# Patient Record
Sex: MALE | Race: BLACK OR AFRICAN AMERICAN | NOT HISPANIC OR LATINO | URBAN - METROPOLITAN AREA
[De-identification: names, ages, dates, MRNs, and addresses within clinical notes are randomized per-mention and may not be internally consistent; named-entity substitution may affect disease eponyms.]

---

## 2017-02-01 ENCOUNTER — INPATIENT (INPATIENT)
Facility: HOSPITAL | Age: 52
LOS: 2 days | Discharge: HOME | End: 2017-02-04
Attending: HOSPITALIST | Admitting: HOSPITALIST
Payer: COMMERCIAL

## 2017-02-01 VITALS
RESPIRATION RATE: 18 BRPM | DIASTOLIC BLOOD PRESSURE: 101 MMHG | TEMPERATURE: 98 F | SYSTOLIC BLOOD PRESSURE: 147 MMHG | OXYGEN SATURATION: 97 % | HEART RATE: 144 BPM

## 2017-02-01 LAB
ALBUMIN SERPL ELPH-MCNC: 5 G/DL — SIGNIFICANT CHANGE UP (ref 3.3–5)
ALP SERPL-CCNC: 69 U/L — SIGNIFICANT CHANGE UP (ref 40–120)
ALT FLD-CCNC: 22 U/L — SIGNIFICANT CHANGE UP (ref 4–41)
APPEARANCE UR: CLEAR — SIGNIFICANT CHANGE UP
APTT BLD: 30.3 SEC — SIGNIFICANT CHANGE UP (ref 27.5–37.4)
AST SERPL-CCNC: 13 U/L — SIGNIFICANT CHANGE UP (ref 4–40)
B-OH-BUTYR SERPL-SCNC: 0.5 MMOL/L — HIGH (ref 0–0.4)
BASE EXCESS BLDV CALC-SCNC: 4 MMOL/L — SIGNIFICANT CHANGE UP
BASE EXCESS BLDV CALC-SCNC: 6.1 MMOL/L — SIGNIFICANT CHANGE UP
BASOPHILS # BLD AUTO: 0.01 K/UL — SIGNIFICANT CHANGE UP (ref 0–0.2)
BASOPHILS NFR BLD AUTO: 0.1 % — SIGNIFICANT CHANGE UP (ref 0–2)
BILIRUB SERPL-MCNC: 0.7 MG/DL — SIGNIFICANT CHANGE UP (ref 0.2–1.2)
BILIRUB UR-MCNC: NEGATIVE — SIGNIFICANT CHANGE UP
BLOOD GAS VENOUS - CREATININE: 1.19 MG/DL — SIGNIFICANT CHANGE UP (ref 0.5–1.3)
BLOOD GAS VENOUS - CREATININE: 1.58 MG/DL — HIGH (ref 0.5–1.3)
BLOOD UR QL VISUAL: NEGATIVE — SIGNIFICANT CHANGE UP
BUN SERPL-MCNC: 27 MG/DL — HIGH (ref 7–23)
CALCIUM SERPL-MCNC: 13.2 MG/DL — CRITICAL HIGH (ref 8.4–10.5)
CHLORIDE BLDV-SCNC: 114 MMOL/L — HIGH (ref 96–108)
CHLORIDE BLDV-SCNC: 97 MMOL/L — SIGNIFICANT CHANGE UP (ref 96–108)
CHLORIDE SERPL-SCNC: 92 MMOL/L — LOW (ref 98–107)
CK MB BLD-MCNC: 1.21 NG/ML — SIGNIFICANT CHANGE UP (ref 1–6.6)
CK MB BLD-MCNC: SIGNIFICANT CHANGE UP (ref 0–2.5)
CK SERPL-CCNC: 132 U/L — SIGNIFICANT CHANGE UP (ref 30–200)
CO2 SERPL-SCNC: 26 MMOL/L — SIGNIFICANT CHANGE UP (ref 22–31)
COLOR SPEC: SIGNIFICANT CHANGE UP
CREAT SERPL-MCNC: 1.91 MG/DL — HIGH (ref 0.5–1.3)
EOSINOPHIL # BLD AUTO: 0 K/UL — SIGNIFICANT CHANGE UP (ref 0–0.5)
EOSINOPHIL NFR BLD AUTO: 0 % — SIGNIFICANT CHANGE UP (ref 0–6)
GAS PNL BLDV: 140 MMOL/L — SIGNIFICANT CHANGE UP (ref 136–146)
GAS PNL BLDV: 148 MMOL/L — HIGH (ref 136–146)
GLUCOSE BLDV-MCNC: 1034 — CRITICAL HIGH (ref 70–99)
GLUCOSE BLDV-MCNC: 594 — CRITICAL HIGH (ref 70–99)
GLUCOSE SERPL-MCNC: 1053 MG/DL — CRITICAL HIGH (ref 70–99)
GLUCOSE UR-MCNC: >1000 — SIGNIFICANT CHANGE UP
HBA1C BLD-MCNC: 12.4 % — HIGH (ref 4–5.6)
HCO3 BLDV-SCNC: 27 MMOL/L — SIGNIFICANT CHANGE UP (ref 20–27)
HCO3 BLDV-SCNC: 27 MMOL/L — SIGNIFICANT CHANGE UP (ref 20–27)
HCT VFR BLD CALC: 46.1 % — SIGNIFICANT CHANGE UP (ref 39–50)
HCT VFR BLDV CALC: 45.2 % — SIGNIFICANT CHANGE UP (ref 39–51)
HCT VFR BLDV CALC: 51.1 % — HIGH (ref 39–51)
HGB BLD-MCNC: 15.4 G/DL — SIGNIFICANT CHANGE UP (ref 13–17)
HGB BLDV-MCNC: 14.7 G/DL — SIGNIFICANT CHANGE UP (ref 13–17)
HGB BLDV-MCNC: 16.7 G/DL — SIGNIFICANT CHANGE UP (ref 13–17)
IMM GRANULOCYTES NFR BLD AUTO: 0.1 % — SIGNIFICANT CHANGE UP (ref 0–1.5)
INR BLD: 1.08 — SIGNIFICANT CHANGE UP (ref 0.87–1.18)
KETONES UR-MCNC: SIGNIFICANT CHANGE UP
LACTATE BLDV-MCNC: 2.8 MMOL/L — HIGH (ref 0.5–2)
LACTATE BLDV-MCNC: 4.5 MMOL/L — CRITICAL HIGH (ref 0.5–2)
LEUKOCYTE ESTERASE UR-ACNC: NEGATIVE — SIGNIFICANT CHANGE UP
LYMPHOCYTES # BLD AUTO: 1.39 K/UL — SIGNIFICANT CHANGE UP (ref 1–3.3)
LYMPHOCYTES # BLD AUTO: 12.5 % — LOW (ref 13–44)
MCHC RBC-ENTMCNC: 30.1 PG — SIGNIFICANT CHANGE UP (ref 27–34)
MCHC RBC-ENTMCNC: 33.4 % — SIGNIFICANT CHANGE UP (ref 32–36)
MCV RBC AUTO: 90.2 FL — SIGNIFICANT CHANGE UP (ref 80–100)
MONOCYTES # BLD AUTO: 0.89 K/UL — SIGNIFICANT CHANGE UP (ref 0–0.9)
MONOCYTES NFR BLD AUTO: 8 % — SIGNIFICANT CHANGE UP (ref 2–14)
NEUTROPHILS # BLD AUTO: 8.79 K/UL — HIGH (ref 1.8–7.4)
NEUTROPHILS NFR BLD AUTO: 79.3 % — HIGH (ref 43–77)
NITRITE UR-MCNC: NEGATIVE — SIGNIFICANT CHANGE UP
PCO2 BLDV: 52 MMHG — HIGH (ref 41–51)
PCO2 BLDV: 56 MMHG — HIGH (ref 41–51)
PH BLDV: 7.37 PH — SIGNIFICANT CHANGE UP (ref 7.32–7.43)
PH BLDV: 7.37 PH — SIGNIFICANT CHANGE UP (ref 7.32–7.43)
PH UR: 6 — SIGNIFICANT CHANGE UP (ref 4.6–8)
PLATELET # BLD AUTO: 212 K/UL — SIGNIFICANT CHANGE UP (ref 150–400)
PMV BLD: 12.4 FL — SIGNIFICANT CHANGE UP (ref 7–13)
PO2 BLDV: 37 MMHG — SIGNIFICANT CHANGE UP (ref 35–40)
PO2 BLDV: 56 MMHG — HIGH (ref 35–40)
POTASSIUM BLDV-SCNC: 3.8 MMOL/L — SIGNIFICANT CHANGE UP (ref 3.4–4.5)
POTASSIUM BLDV-SCNC: 3.8 MMOL/L — SIGNIFICANT CHANGE UP (ref 3.4–4.5)
POTASSIUM SERPL-MCNC: 3.8 MMOL/L — SIGNIFICANT CHANGE UP (ref 3.5–5.3)
POTASSIUM SERPL-SCNC: 3.8 MMOL/L — SIGNIFICANT CHANGE UP (ref 3.5–5.3)
PROT SERPL-MCNC: 8.8 G/DL — HIGH (ref 6–8.3)
PROT UR-MCNC: NEGATIVE — SIGNIFICANT CHANGE UP
PROTHROM AB SERPL-ACNC: 12.3 SEC — SIGNIFICANT CHANGE UP (ref 10–13.1)
RBC # BLD: 5.11 M/UL — SIGNIFICANT CHANGE UP (ref 4.2–5.8)
RBC # FLD: 12.8 % — SIGNIFICANT CHANGE UP (ref 10.3–14.5)
RBC CASTS # UR COMP ASSIST: SIGNIFICANT CHANGE UP (ref 0–?)
SAO2 % BLDV: 61.9 % — SIGNIFICANT CHANGE UP (ref 60–85)
SAO2 % BLDV: 86.4 % — HIGH (ref 60–85)
SODIUM SERPL-SCNC: 142 MMOL/L — SIGNIFICANT CHANGE UP (ref 135–145)
SP GR SPEC: 1.03 — HIGH (ref 1–1.03)
T3 SERPL-MCNC: 91.9 NG/DL — SIGNIFICANT CHANGE UP (ref 80–200)
T4 FREE SERPL-MCNC: 1.63 NG/DL — SIGNIFICANT CHANGE UP (ref 0.9–1.8)
TROPONIN T SERPL-MCNC: < 0.06 NG/ML — SIGNIFICANT CHANGE UP (ref 0–0.06)
TSH SERPL-MCNC: 0.3 UIU/ML — SIGNIFICANT CHANGE UP (ref 0.27–4.2)
UROBILINOGEN FLD QL: NORMAL E.U. — SIGNIFICANT CHANGE UP (ref 0.1–0.2)
WBC # BLD: 11.09 K/UL — HIGH (ref 3.8–10.5)
WBC # FLD AUTO: 11.09 K/UL — HIGH (ref 3.8–10.5)
WBC UR QL: SIGNIFICANT CHANGE UP (ref 0–?)

## 2017-02-01 RX ORDER — SODIUM CHLORIDE 9 MG/ML
2000 INJECTION INTRAMUSCULAR; INTRAVENOUS; SUBCUTANEOUS ONCE
Qty: 0 | Refills: 0 | Status: COMPLETED | OUTPATIENT
Start: 2017-02-01 | End: 2017-02-01

## 2017-02-01 RX ORDER — POTASSIUM CHLORIDE 20 MEQ
40 PACKET (EA) ORAL ONCE
Qty: 0 | Refills: 0 | Status: COMPLETED | OUTPATIENT
Start: 2017-02-01 | End: 2017-02-01

## 2017-02-01 RX ORDER — SODIUM CHLORIDE 9 MG/ML
1000 INJECTION, SOLUTION INTRAVENOUS
Qty: 0 | Refills: 0 | Status: COMPLETED | OUTPATIENT
Start: 2017-02-01 | End: 2017-02-02

## 2017-02-01 RX ORDER — POTASSIUM CHLORIDE 20 MEQ
10 PACKET (EA) ORAL ONCE
Qty: 0 | Refills: 0 | Status: COMPLETED | OUTPATIENT
Start: 2017-02-01 | End: 2017-02-01

## 2017-02-01 RX ORDER — INSULIN HUMAN 100 [IU]/ML
4 INJECTION, SOLUTION SUBCUTANEOUS
Qty: 100 | Refills: 0 | Status: DISCONTINUED | OUTPATIENT
Start: 2017-02-01 | End: 2017-02-02

## 2017-02-01 RX ADMIN — Medication 40 MILLIEQUIVALENT(S): at 19:29

## 2017-02-01 RX ADMIN — SODIUM CHLORIDE 1000 MILLILITER(S): 9 INJECTION, SOLUTION INTRAVENOUS at 23:52

## 2017-02-01 RX ADMIN — INSULIN HUMAN 4 UNIT(S)/HR: 100 INJECTION, SOLUTION SUBCUTANEOUS at 20:30

## 2017-02-01 RX ADMIN — SODIUM CHLORIDE 1000 MILLILITER(S): 9 INJECTION INTRAMUSCULAR; INTRAVENOUS; SUBCUTANEOUS at 20:05

## 2017-02-01 RX ADMIN — Medication 100 MILLIEQUIVALENT(S): at 22:16

## 2017-02-01 RX ADMIN — SODIUM CHLORIDE 1000 MILLILITER(S): 9 INJECTION INTRAMUSCULAR; INTRAVENOUS; SUBCUTANEOUS at 18:15

## 2017-02-01 RX ADMIN — INSULIN HUMAN 4 UNIT(S)/HR: 100 INJECTION, SOLUTION SUBCUTANEOUS at 19:22

## 2017-02-01 NOTE — ED ADULT TRIAGE NOTE - CHIEF COMPLAINT QUOTE
pt c/o worsening dizziness over the past two days, and reports he is unable to "keep anything down, I keep peeing it all out". denies any other symptoms. PMHX; borderline dm, htn, thyroidectomy, high cholesterol

## 2017-02-01 NOTE — ED PROVIDER NOTE - MEDICAL DECISION MAKING DETAILS
51M h/o HTN, HLD, DM presenting with nausea, lightheadedness, polyuria, polydipsia, concerning for symptomatic hyperglycemia vs HONK  -labs, ekg, fluids

## 2017-02-01 NOTE — ED ADULT NURSE NOTE - CHPI ED SYMPTOMS NEG
no nausea/no back pain/no shortness of breath/no diaphoresis/no fever/no chills/no syncope/no vomiting/no cough

## 2017-02-01 NOTE — ED PROVIDER NOTE - ATTENDING CONTRIBUTION TO CARE
pt c/o 2 days of dizziness associated polyuria/polydipsia wt loss    exam tachycardic  dry mouth clear lungs  benign abf  no LE edema  no tremor  nl thyroid  bedside FS   glucose high   stat glucose  > 1000    K 3.8  IV fluids begun  will also start insulin  ICU and endocrine consulted

## 2017-02-01 NOTE — ED PROVIDER NOTE - OBJECTIVE STATEMENT
51M h/o DM2, HTN, thyroidectomy, HLD presenting with lightheadedness. Notes polyuria, polydipsia, nausea, lightheadedness, blurry vision for past 2 days associated with weight loss. Takes metformin for diabetes, not on insulin. Denies F, cough, CP/SOB, abd pain, vomiting, dysuria.

## 2017-02-01 NOTE — ED PROVIDER NOTE - PROGRESS NOTE DETAILS
D/w endo who recommended continuing insulin gtt until FSG falls to 300, sending T3 & free T4, and MICU consult. D/w MICU who will be seeing pt

## 2017-02-02 DIAGNOSIS — N17.9 ACUTE KIDNEY FAILURE, UNSPECIFIED: ICD-10-CM

## 2017-02-02 DIAGNOSIS — E87.0 HYPEROSMOLALITY AND HYPERNATREMIA: ICD-10-CM

## 2017-02-02 DIAGNOSIS — E11.01 TYPE 2 DIABETES MELLITUS WITH HYPEROSMOLARITY WITH COMA: ICD-10-CM

## 2017-02-02 DIAGNOSIS — Z92.3 PERSONAL HISTORY OF IRRADIATION: Chronic | ICD-10-CM

## 2017-02-02 DIAGNOSIS — R73.9 HYPERGLYCEMIA, UNSPECIFIED: ICD-10-CM

## 2017-02-02 DIAGNOSIS — E07.9 DISORDER OF THYROID, UNSPECIFIED: ICD-10-CM

## 2017-02-02 DIAGNOSIS — E89.0 POSTPROCEDURAL HYPOTHYROIDISM: Chronic | ICD-10-CM

## 2017-02-02 LAB
BASE EXCESS BLDV CALC-SCNC: 3.6 MMOL/L — SIGNIFICANT CHANGE UP
BASOPHILS # BLD AUTO: 0.02 K/UL — SIGNIFICANT CHANGE UP (ref 0–0.2)
BASOPHILS NFR BLD AUTO: 0.2 % — SIGNIFICANT CHANGE UP (ref 0–2)
BLOOD GAS VENOUS - CREATININE: 1.17 MG/DL — SIGNIFICANT CHANGE UP (ref 0.5–1.3)
BUN SERPL-MCNC: 18 MG/DL — SIGNIFICANT CHANGE UP (ref 7–23)
BUN SERPL-MCNC: 20 MG/DL — SIGNIFICANT CHANGE UP (ref 7–23)
CALCIUM SERPL-MCNC: 10.1 MG/DL — SIGNIFICANT CHANGE UP (ref 8.4–10.5)
CALCIUM SERPL-MCNC: 10.5 MG/DL — SIGNIFICANT CHANGE UP (ref 8.4–10.5)
CHLORIDE BLDV-SCNC: 116 MMOL/L — HIGH (ref 96–108)
CHLORIDE SERPL-SCNC: 110 MMOL/L — HIGH (ref 98–107)
CHLORIDE SERPL-SCNC: 113 MMOL/L — HIGH (ref 98–107)
CHOLEST SERPL-MCNC: 131 MG/DL — SIGNIFICANT CHANGE UP (ref 120–199)
CO2 SERPL-SCNC: 27 MMOL/L — SIGNIFICANT CHANGE UP (ref 22–31)
CO2 SERPL-SCNC: 28 MMOL/L — SIGNIFICANT CHANGE UP (ref 22–31)
CREAT SERPL-MCNC: 1.23 MG/DL — SIGNIFICANT CHANGE UP (ref 0.5–1.3)
CREAT SERPL-MCNC: 1.39 MG/DL — HIGH (ref 0.5–1.3)
EOSINOPHIL # BLD AUTO: 0.04 K/UL — SIGNIFICANT CHANGE UP (ref 0–0.5)
EOSINOPHIL NFR BLD AUTO: 0.3 % — SIGNIFICANT CHANGE UP (ref 0–6)
GAS PNL BLDV: 150 MMOL/L — HIGH (ref 136–146)
GLUCOSE BLDV-MCNC: 404 — HIGH (ref 70–99)
GLUCOSE SERPL-MCNC: 374 MG/DL — HIGH (ref 70–99)
GLUCOSE SERPL-MCNC: 414 MG/DL — HIGH (ref 70–99)
HCO3 BLDV-SCNC: 26 MMOL/L — SIGNIFICANT CHANGE UP (ref 20–27)
HCT VFR BLD CALC: 43.9 % — SIGNIFICANT CHANGE UP (ref 39–50)
HCT VFR BLDV CALC: 44.2 % — SIGNIFICANT CHANGE UP (ref 39–51)
HDLC SERPL-MCNC: 46 MG/DL — SIGNIFICANT CHANGE UP (ref 35–55)
HGB BLD-MCNC: 14.2 G/DL — SIGNIFICANT CHANGE UP (ref 13–17)
HGB BLDV-MCNC: 14.4 G/DL — SIGNIFICANT CHANGE UP (ref 13–17)
IMM GRANULOCYTES NFR BLD AUTO: 0.3 % — SIGNIFICANT CHANGE UP (ref 0–1.5)
LACTATE BLDV-MCNC: 2.4 MMOL/L — HIGH (ref 0.5–2)
LACTATE SERPL-SCNC: 3.1 MMOL/L — HIGH (ref 0.5–2)
LIPID PNL WITH DIRECT LDL SERPL: 79 MG/DL — SIGNIFICANT CHANGE UP
LYMPHOCYTES # BLD AUTO: 16.7 % — SIGNIFICANT CHANGE UP (ref 13–44)
LYMPHOCYTES # BLD AUTO: 2.11 K/UL — SIGNIFICANT CHANGE UP (ref 1–3.3)
MAGNESIUM SERPL-MCNC: 2 MG/DL — SIGNIFICANT CHANGE UP (ref 1.6–2.6)
MAGNESIUM SERPL-MCNC: 2 MG/DL — SIGNIFICANT CHANGE UP (ref 1.6–2.6)
MCHC RBC-ENTMCNC: 29.6 PG — SIGNIFICANT CHANGE UP (ref 27–34)
MCHC RBC-ENTMCNC: 32.3 % — SIGNIFICANT CHANGE UP (ref 32–36)
MCV RBC AUTO: 91.5 FL — SIGNIFICANT CHANGE UP (ref 80–100)
MONOCYTES # BLD AUTO: 1.26 K/UL — HIGH (ref 0–0.9)
MONOCYTES NFR BLD AUTO: 10 % — SIGNIFICANT CHANGE UP (ref 2–14)
NEUTROPHILS # BLD AUTO: 9.15 K/UL — HIGH (ref 1.8–7.4)
NEUTROPHILS NFR BLD AUTO: 72.5 % — SIGNIFICANT CHANGE UP (ref 43–77)
PCO2 BLDV: 56 MMHG — HIGH (ref 41–51)
PH BLDV: 7.34 PH — SIGNIFICANT CHANGE UP (ref 7.32–7.43)
PHOSPHATE SERPL-MCNC: 3.2 MG/DL — SIGNIFICANT CHANGE UP (ref 2.5–4.5)
PHOSPHATE SERPL-MCNC: 3.3 MG/DL — SIGNIFICANT CHANGE UP (ref 2.5–4.5)
PLATELET # BLD AUTO: 169 K/UL — SIGNIFICANT CHANGE UP (ref 150–400)
PMV BLD: 11.8 FL — SIGNIFICANT CHANGE UP (ref 7–13)
PO2 BLDV: 48 MMHG — HIGH (ref 35–40)
POTASSIUM BLDV-SCNC: 4.1 MMOL/L — SIGNIFICANT CHANGE UP (ref 3.4–4.5)
POTASSIUM SERPL-MCNC: 4.9 MMOL/L — SIGNIFICANT CHANGE UP (ref 3.5–5.3)
POTASSIUM SERPL-MCNC: 4.9 MMOL/L — SIGNIFICANT CHANGE UP (ref 3.5–5.3)
POTASSIUM SERPL-SCNC: 4.9 MMOL/L — SIGNIFICANT CHANGE UP (ref 3.5–5.3)
POTASSIUM SERPL-SCNC: 4.9 MMOL/L — SIGNIFICANT CHANGE UP (ref 3.5–5.3)
RBC # BLD: 4.8 M/UL — SIGNIFICANT CHANGE UP (ref 4.2–5.8)
RBC # FLD: 12.8 % — SIGNIFICANT CHANGE UP (ref 10.3–14.5)
SAO2 % BLDV: 79.6 % — SIGNIFICANT CHANGE UP (ref 60–85)
SODIUM SERPL-SCNC: 151 MMOL/L — HIGH (ref 135–145)
SODIUM SERPL-SCNC: 156 MMOL/L — HIGH (ref 135–145)
TRIGL SERPL-MCNC: 93 MG/DL — SIGNIFICANT CHANGE UP (ref 10–149)
WBC # BLD: 12.62 K/UL — HIGH (ref 3.8–10.5)
WBC # FLD AUTO: 12.62 K/UL — HIGH (ref 3.8–10.5)

## 2017-02-02 PROCEDURE — 99223 1ST HOSP IP/OBS HIGH 75: CPT | Mod: GC

## 2017-02-02 RX ORDER — GLUCAGON INJECTION, SOLUTION 0.5 MG/.1ML
1 INJECTION, SOLUTION SUBCUTANEOUS ONCE
Qty: 0 | Refills: 0 | Status: DISCONTINUED | OUTPATIENT
Start: 2017-02-02 | End: 2017-02-04

## 2017-02-02 RX ORDER — DEXTROSE 50 % IN WATER 50 %
25 SYRINGE (ML) INTRAVENOUS ONCE
Qty: 0 | Refills: 0 | Status: DISCONTINUED | OUTPATIENT
Start: 2017-02-02 | End: 2017-02-04

## 2017-02-02 RX ORDER — SIMVASTATIN 20 MG/1
1 TABLET, FILM COATED ORAL
Qty: 0 | Refills: 0 | COMMUNITY

## 2017-02-02 RX ORDER — INSULIN LISPRO 100/ML
VIAL (ML) SUBCUTANEOUS EVERY 4 HOURS
Qty: 0 | Refills: 0 | Status: DISCONTINUED | OUTPATIENT
Start: 2017-02-02 | End: 2017-02-02

## 2017-02-02 RX ORDER — INSULIN GLARGINE 100 [IU]/ML
20 INJECTION, SOLUTION SUBCUTANEOUS AT BEDTIME
Qty: 0 | Refills: 0 | Status: DISCONTINUED | OUTPATIENT
Start: 2017-02-02 | End: 2017-02-04

## 2017-02-02 RX ORDER — LEVOTHYROXINE SODIUM 125 MCG
112 TABLET ORAL DAILY
Qty: 0 | Refills: 0 | Status: DISCONTINUED | OUTPATIENT
Start: 2017-02-02 | End: 2017-02-04

## 2017-02-02 RX ORDER — INSULIN LISPRO 100/ML
5 VIAL (ML) SUBCUTANEOUS
Qty: 0 | Refills: 0 | Status: DISCONTINUED | OUTPATIENT
Start: 2017-02-02 | End: 2017-02-02

## 2017-02-02 RX ORDER — SODIUM CHLORIDE 9 MG/ML
1000 INJECTION, SOLUTION INTRAVENOUS
Qty: 0 | Refills: 0 | Status: DISCONTINUED | OUTPATIENT
Start: 2017-02-02 | End: 2017-02-04

## 2017-02-02 RX ORDER — SIMVASTATIN 20 MG/1
20 TABLET, FILM COATED ORAL AT BEDTIME
Qty: 0 | Refills: 0 | Status: DISCONTINUED | OUTPATIENT
Start: 2017-02-02 | End: 2017-02-04

## 2017-02-02 RX ORDER — AMLODIPINE BESYLATE 2.5 MG/1
10 TABLET ORAL DAILY
Qty: 0 | Refills: 0 | Status: DISCONTINUED | OUTPATIENT
Start: 2017-02-02 | End: 2017-02-04

## 2017-02-02 RX ORDER — SIMVASTATIN 20 MG/1
40 TABLET, FILM COATED ORAL AT BEDTIME
Qty: 0 | Refills: 0 | Status: DISCONTINUED | OUTPATIENT
Start: 2017-02-02 | End: 2017-02-02

## 2017-02-02 RX ORDER — INSULIN LISPRO 100/ML
6 VIAL (ML) SUBCUTANEOUS
Qty: 0 | Refills: 0 | Status: DISCONTINUED | OUTPATIENT
Start: 2017-02-02 | End: 2017-02-03

## 2017-02-02 RX ORDER — LEVOTHYROXINE SODIUM 125 MCG
1 TABLET ORAL
Qty: 0 | Refills: 0 | COMMUNITY

## 2017-02-02 RX ORDER — INSULIN LISPRO 100/ML
VIAL (ML) SUBCUTANEOUS
Qty: 0 | Refills: 0 | Status: DISCONTINUED | OUTPATIENT
Start: 2017-02-02 | End: 2017-02-04

## 2017-02-02 RX ORDER — DEXTROSE 50 % IN WATER 50 %
12.5 SYRINGE (ML) INTRAVENOUS ONCE
Qty: 0 | Refills: 0 | Status: DISCONTINUED | OUTPATIENT
Start: 2017-02-02 | End: 2017-02-04

## 2017-02-02 RX ORDER — INSULIN LISPRO 100/ML
VIAL (ML) SUBCUTANEOUS AT BEDTIME
Qty: 0 | Refills: 0 | Status: DISCONTINUED | OUTPATIENT
Start: 2017-02-02 | End: 2017-02-04

## 2017-02-02 RX ORDER — HEPARIN SODIUM 5000 [USP'U]/ML
5000 INJECTION INTRAVENOUS; SUBCUTANEOUS EVERY 8 HOURS
Qty: 0 | Refills: 0 | Status: DISCONTINUED | OUTPATIENT
Start: 2017-02-02 | End: 2017-02-04

## 2017-02-02 RX ORDER — METFORMIN HYDROCHLORIDE 850 MG/1
1 TABLET ORAL
Qty: 0 | Refills: 0 | COMMUNITY

## 2017-02-02 RX ORDER — DEXTROSE 50 % IN WATER 50 %
1 SYRINGE (ML) INTRAVENOUS ONCE
Qty: 0 | Refills: 0 | Status: DISCONTINUED | OUTPATIENT
Start: 2017-02-02 | End: 2017-02-04

## 2017-02-02 RX ORDER — INSULIN GLARGINE 100 [IU]/ML
15 INJECTION, SOLUTION SUBCUTANEOUS ONCE
Qty: 0 | Refills: 0 | Status: COMPLETED | OUTPATIENT
Start: 2017-02-02 | End: 2017-02-02

## 2017-02-02 RX ORDER — INSULIN LISPRO 100/ML
5 VIAL (ML) SUBCUTANEOUS ONCE
Qty: 0 | Refills: 0 | Status: DISCONTINUED | OUTPATIENT
Start: 2017-02-02 | End: 2017-02-02

## 2017-02-02 RX ADMIN — HEPARIN SODIUM 5000 UNIT(S): 5000 INJECTION INTRAVENOUS; SUBCUTANEOUS at 07:42

## 2017-02-02 RX ADMIN — SODIUM CHLORIDE 1000 MILLILITER(S): 9 INJECTION, SOLUTION INTRAVENOUS at 00:46

## 2017-02-02 RX ADMIN — SIMVASTATIN 20 MILLIGRAM(S): 20 TABLET, FILM COATED ORAL at 21:48

## 2017-02-02 RX ADMIN — AMLODIPINE BESYLATE 10 MILLIGRAM(S): 2.5 TABLET ORAL at 07:43

## 2017-02-02 RX ADMIN — INSULIN GLARGINE 15 UNIT(S): 100 INJECTION, SOLUTION SUBCUTANEOUS at 01:09

## 2017-02-02 RX ADMIN — INSULIN GLARGINE 20 UNIT(S): 100 INJECTION, SOLUTION SUBCUTANEOUS at 21:48

## 2017-02-02 RX ADMIN — Medication 6: at 18:01

## 2017-02-02 RX ADMIN — Medication 4: at 14:07

## 2017-02-02 RX ADMIN — HEPARIN SODIUM 5000 UNIT(S): 5000 INJECTION INTRAVENOUS; SUBCUTANEOUS at 14:07

## 2017-02-02 RX ADMIN — HEPARIN SODIUM 5000 UNIT(S): 5000 INJECTION INTRAVENOUS; SUBCUTANEOUS at 21:48

## 2017-02-02 RX ADMIN — Medication 112 MICROGRAM(S): at 07:42

## 2017-02-02 RX ADMIN — SODIUM CHLORIDE 100 MILLILITER(S): 9 INJECTION, SOLUTION INTRAVENOUS at 07:50

## 2017-02-02 RX ADMIN — Medication 5: at 10:42

## 2017-02-02 RX ADMIN — Medication 6 UNIT(S): at 18:00

## 2017-02-02 NOTE — H&P ADULT. - PROBLEM SELECTOR PLAN 1
Meets criteria for HHS given hyperglycemia >600 w/ trace serum & urinary ketones  -S/p insulin gtt  -Endo eval in ED- verbal recs were to c/w insulin gtt until   -Trend BMP Meets criteria for HHS given hyperglycemia >600 w/ trace serum & urinary ketones  -S/p insulin gtt  -Endo eval in ED- verbal recs were to c/w insulin gtt until   -Trend BMP, FS q4hr  -C/w IVF Meets criteria for HHS given hyperglycemia >600 w/ trace serum & urinary ketones  -S/p insulin gtt & 5L IVF  -Endo eval in ED- verbal recs were to c/w insulin gtt until   -Trend BMP, FS q4hr  -C/w IVF

## 2017-02-02 NOTE — H&P ADULT. - PROBLEM SELECTOR PLAN 2
Hyperglycemia typically cause hyponatremia, elevation after IVF possibly due to electrolyte shifts  -Trend BMP Hyperglycemia typically cause hyponatremia, elevation after IVF possibly due to electrolyte shifts  -Trend BMP  -1/2NS at 100cc/hr

## 2017-02-02 NOTE — H&P ADULT. - PMH
HLD (hyperlipidemia)    HTN (hypertension)    T2DM (type 2 diabetes mellitus) HLD (hyperlipidemia)    HTN (hypertension)    Hyperthyroidism    T2DM (type 2 diabetes mellitus)

## 2017-02-02 NOTE — H&P ADULT. - HISTORY OF PRESENT ILLNESS
51M hx HTN, HLD, T2DM, s/p thyroidectomy, p/w lightheadedness in setting of polyuria, polydipsia, nausea, blurry vision x 2 days.  Pt is on metformin for his T2DM. Pt denies any fevers, chills, HA, CP, SOB, abd pain, V, changes in BMs.    ED course:  T 97.7, HR 93, /89, RR 20, O2 100% RA. Labs remarkable for glucose 1053, Na 142, K 3.8, Ca 13.2, BUN/Cr 25/1.91. Lactate 4.5. Trace serum & urinary ketones. Received insulin gtt at 4 units/hr, lantus 15U, IV & PO K+ supplementation, NS 4L & 1/2 NS 1L. On repeat labs, glucose 414, Na 156, Ca 10.5, BUN/Cr 20.139, lactate 2.4.  HgbA1c 12.4. 51M hx HTN, HLD, T2DM, hyperthyroidism s/p ablation, p/w lightheadedness in setting of polyuria, polydipsia, nausea, blurry vision x 2-3 days. Pt also endorsing some decreased appetite but he denies any abd pain or vomiting. He reports some unintentional weight loss as he recently was at his PMD w/ a weight of 211 lbs which is a decrease from his baseline of 225 lbs. Pt is on metformin for his T2DM. Pt denies any fevers, chills, HA, CP, SOB, abd pain, V, changes in bowel movements.    ED course:  T 97.7, HR 93, /89, RR 20, O2 100% RA. Labs remarkable for glucose 1053, Na 142, K 3.8, Ca 13.2, BUN/Cr 25/1.91. Lactate 4.5. Trace serum & urinary ketones. Received insulin gtt at 4 units/hr, lantus 15U, IV & PO K+ supplementation, NS 4L & 1/2 NS 1L. On repeat labs, glucose 414, Na 156, Ca 10.5, BUN/Cr 20.139, lactate 2.4.  HgbA1c 12.4.

## 2017-02-02 NOTE — H&P ADULT. - ASSESSMENT
51M hx HTN, HLD, T2DM, s/p thyroidectomy, p/w lightheadedness in setting of polyuria, polydipsia, nausea, blurry vision x 2 days, found to have glucose >1000 & trace urinary & serum ketones, also w/ HgbA1c 12.4 51M hx HTN, HLD, T2DM, hyperthyroidism s/p ablation, p/w lightheadedness in setting of polyuria, polydipsia, nausea, blurry vision x 2 days, found to have glucose >1000 & trace urinary & serum ketones, also w/ HgbA1c 12.4

## 2017-02-02 NOTE — H&P ADULT. - PROBLEM SELECTOR PLAN 3
Pre-renal given improvement w/ IVF  -Trend BMP Pre-renal given improvement w/ IVF  -Trend BMP  -C/w IVF Pre-renal given improvement w/ IVF  -Trend BMP  -C/w IVF  -Holding valsartan/HCTZ

## 2017-02-02 NOTE — H&P ADULT. - RS GEN PE MLT RESP DETAILS PC
clear to auscultation bilaterally/respirations non-labored no chest wall tenderness/respirations non-labored/clear to auscultation bilaterally

## 2017-02-02 NOTE — H&P ADULT. - ATTENDING COMMENTS
50 y/o male HX of Uncontrolled DM Type2, HTN, High Cholesterol, Hyperthyroid, S/P Ablation, now on Synthroid, admitted for Hyperglycemia, Glucose > 1000, Lightheadedness, weakness, + Wt Loss, decreased po intake, Blurry vision, Polyuria, polydipsia, Dehydration, S/P treated with IVF, Insulin drip , HgbA1c 12.4, Na 156, FS dropped to 330, S/P Lantus 15 unit SQ X 1,     Endo consult, IVF 1/2 NS @ 100 cc/hr, BMP, CBC F/U, FS, Sliding scale Q 4 hr, Lantus dose as per Endo, DVT Prophylaxis: SQ Heparin, Hold Diuretics , on Synthroid, Zocor, Norvasc , Fall/aspiration precaution , Chest X Ray    Case D/W Pt, HS    Pt was seen & Examined by me, Dr. KAMLESH Markham on 2/2/2017.

## 2017-02-02 NOTE — PATIENT PROFILE ADULT. - VISION (WITH CORRECTIVE LENSES IF THE PATIENT USUALLY WEARS THEM):
blurry vision r/t this admission dx/Normal vision: sees adequately in most situations; can see medication labels, newsprint

## 2017-02-03 PROBLEM — I10 ESSENTIAL (PRIMARY) HYPERTENSION: Chronic | Status: ACTIVE | Noted: 2017-02-01

## 2017-02-03 PROBLEM — Z00.00 ENCOUNTER FOR PREVENTIVE HEALTH EXAMINATION: Status: ACTIVE | Noted: 2017-02-03

## 2017-02-03 PROBLEM — E78.5 HYPERLIPIDEMIA, UNSPECIFIED: Chronic | Status: ACTIVE | Noted: 2017-02-01

## 2017-02-03 PROBLEM — E11.9 TYPE 2 DIABETES MELLITUS WITHOUT COMPLICATIONS: Chronic | Status: ACTIVE | Noted: 2017-02-01

## 2017-02-03 LAB
ALBUMIN SERPL ELPH-MCNC: 3.8 G/DL — SIGNIFICANT CHANGE UP (ref 3.3–5)
ALP SERPL-CCNC: 50 U/L — SIGNIFICANT CHANGE UP (ref 40–120)
ALT FLD-CCNC: 20 U/L — SIGNIFICANT CHANGE UP (ref 4–41)
AST SERPL-CCNC: 47 U/L — HIGH (ref 4–40)
BASOPHILS # BLD AUTO: 0.02 K/UL — SIGNIFICANT CHANGE UP (ref 0–0.2)
BASOPHILS NFR BLD AUTO: 0.2 % — SIGNIFICANT CHANGE UP (ref 0–2)
BILIRUB SERPL-MCNC: 0.6 MG/DL — SIGNIFICANT CHANGE UP (ref 0.2–1.2)
BUN SERPL-MCNC: 13 MG/DL — SIGNIFICANT CHANGE UP (ref 7–23)
CALCIUM SERPL-MCNC: 9.5 MG/DL — SIGNIFICANT CHANGE UP (ref 8.4–10.5)
CHLORIDE SERPL-SCNC: 108 MMOL/L — HIGH (ref 98–107)
CO2 SERPL-SCNC: 29 MMOL/L — SIGNIFICANT CHANGE UP (ref 22–31)
CREAT SERPL-MCNC: 0.99 MG/DL — SIGNIFICANT CHANGE UP (ref 0.5–1.3)
EOSINOPHIL # BLD AUTO: 0.1 K/UL — SIGNIFICANT CHANGE UP (ref 0–0.5)
EOSINOPHIL NFR BLD AUTO: 1.1 % — SIGNIFICANT CHANGE UP (ref 0–6)
GLUCOSE SERPL-MCNC: 163 MG/DL — HIGH (ref 70–99)
HAV IGM SER-ACNC: NONREACTIVE — SIGNIFICANT CHANGE UP
HBV CORE IGM SER-ACNC: NONREACTIVE — SIGNIFICANT CHANGE UP
HBV SURFACE AG SER-ACNC: NONREACTIVE — SIGNIFICANT CHANGE UP
HCT VFR BLD CALC: 46.2 % — SIGNIFICANT CHANGE UP (ref 39–50)
HCV AB S/CO SERPL IA: 0.36 S/CO — SIGNIFICANT CHANGE UP
HCV AB SERPL-IMP: SIGNIFICANT CHANGE UP
HGB BLD-MCNC: 14.8 G/DL — SIGNIFICANT CHANGE UP (ref 13–17)
IMM GRANULOCYTES NFR BLD AUTO: 0.2 % — SIGNIFICANT CHANGE UP (ref 0–1.5)
LYMPHOCYTES # BLD AUTO: 2.53 K/UL — SIGNIFICANT CHANGE UP (ref 1–3.3)
LYMPHOCYTES # BLD AUTO: 27.4 % — SIGNIFICANT CHANGE UP (ref 13–44)
MCHC RBC-ENTMCNC: 29.7 PG — SIGNIFICANT CHANGE UP (ref 27–34)
MCHC RBC-ENTMCNC: 32 % — SIGNIFICANT CHANGE UP (ref 32–36)
MCV RBC AUTO: 92.8 FL — SIGNIFICANT CHANGE UP (ref 80–100)
MONOCYTES # BLD AUTO: 0.62 K/UL — SIGNIFICANT CHANGE UP (ref 0–0.9)
MONOCYTES NFR BLD AUTO: 6.7 % — SIGNIFICANT CHANGE UP (ref 2–14)
NEUTROPHILS # BLD AUTO: 5.96 K/UL — SIGNIFICANT CHANGE UP (ref 1.8–7.4)
NEUTROPHILS NFR BLD AUTO: 64.4 % — SIGNIFICANT CHANGE UP (ref 43–77)
PLATELET # BLD AUTO: 166 K/UL — SIGNIFICANT CHANGE UP (ref 150–400)
PMV BLD: 11.7 FL — SIGNIFICANT CHANGE UP (ref 7–13)
POTASSIUM SERPL-MCNC: 3.5 MMOL/L — SIGNIFICANT CHANGE UP (ref 3.5–5.3)
POTASSIUM SERPL-SCNC: 3.5 MMOL/L — SIGNIFICANT CHANGE UP (ref 3.5–5.3)
PROT SERPL-MCNC: 6.8 G/DL — SIGNIFICANT CHANGE UP (ref 6–8.3)
RBC # BLD: 4.98 M/UL — SIGNIFICANT CHANGE UP (ref 4.2–5.8)
RBC # FLD: 13 % — SIGNIFICANT CHANGE UP (ref 10.3–14.5)
SODIUM SERPL-SCNC: 150 MMOL/L — HIGH (ref 135–145)
WBC # BLD: 9.25 K/UL — SIGNIFICANT CHANGE UP (ref 3.8–10.5)
WBC # FLD AUTO: 9.25 K/UL — SIGNIFICANT CHANGE UP (ref 3.8–10.5)

## 2017-02-03 PROCEDURE — 99233 SBSQ HOSP IP/OBS HIGH 50: CPT

## 2017-02-03 PROCEDURE — 99232 SBSQ HOSP IP/OBS MODERATE 35: CPT

## 2017-02-03 RX ORDER — INSULIN LISPRO 100/ML
8 VIAL (ML) SUBCUTANEOUS
Qty: 0 | Refills: 0 | Status: DISCONTINUED | OUTPATIENT
Start: 2017-02-03 | End: 2017-02-04

## 2017-02-03 RX ADMIN — Medication 6 UNIT(S): at 08:36

## 2017-02-03 RX ADMIN — Medication 8 UNIT(S): at 18:01

## 2017-02-03 RX ADMIN — Medication 2: at 18:00

## 2017-02-03 RX ADMIN — SIMVASTATIN 20 MILLIGRAM(S): 20 TABLET, FILM COATED ORAL at 22:32

## 2017-02-03 RX ADMIN — HEPARIN SODIUM 5000 UNIT(S): 5000 INJECTION INTRAVENOUS; SUBCUTANEOUS at 05:14

## 2017-02-03 RX ADMIN — Medication 2: at 08:34

## 2017-02-03 RX ADMIN — Medication 4: at 12:27

## 2017-02-03 RX ADMIN — AMLODIPINE BESYLATE 10 MILLIGRAM(S): 2.5 TABLET ORAL at 05:14

## 2017-02-03 RX ADMIN — Medication 112 MICROGRAM(S): at 05:14

## 2017-02-03 RX ADMIN — Medication 6 UNIT(S): at 12:27

## 2017-02-03 RX ADMIN — HEPARIN SODIUM 5000 UNIT(S): 5000 INJECTION INTRAVENOUS; SUBCUTANEOUS at 22:38

## 2017-02-03 RX ADMIN — INSULIN GLARGINE 20 UNIT(S): 100 INJECTION, SOLUTION SUBCUTANEOUS at 22:32

## 2017-02-03 NOTE — DIETITIAN INITIAL EVALUATION ADULT. - OTHER INFO
Pt. reports good appetite/PO intake & denies food allergies, nausea/vomiting/diarrhea/constipation, or issues with chewing/swallowing.  Pt. with no prior diet/nutrition education in regards to DM & was not self monitoring blood glucose values at home.  Informed/explained HbA1c & findings.  Extensively discussed suggested therapeutic diet modifications, including carbohydrate food sources & importance of consistent carbohydrate intake.  Advised on nutrition label reading,  & hypoglycemia prevention.  Encouraged adherence w glucose monitoring and endocrinology f/u, upon D/C.  Risks associated with prolonged hyperglycemia also verbalized to Pt.  Printed education materials provided.  Pt. able to verbalize understanding of nutritional instruction.  Advised RDN remains available.

## 2017-02-03 NOTE — DIETITIAN INITIAL EVALUATION ADULT. - PROBLEM SELECTOR PLAN 1
Meets criteria for HHS given hyperglycemia >600 w/ trace serum & urinary ketones  -S/p insulin gtt & 5L IVF  -Endo eval in ED- verbal recs were to c/w insulin gtt until   -Trend BMP, FS q4hr  -C/w IVF

## 2017-02-03 NOTE — DIETITIAN INITIAL EVALUATION ADULT. - NS AS NUTRI INTERV COLLABORAT
1)Suggest Consistent Carbohydrate w evening snack, DASH/TLC (cholesterol & Na restricted) diet.  2)Obtain weekly weights  3)Suggest outpatient endocrinology and CDE follow up.

## 2017-02-03 NOTE — DIETITIAN INITIAL EVALUATION ADULT. - NS AS NUTRI INTERV ED CONTENT
Purpose of the nutrition education/Priority modifications/Nutrition relationship to health/disease/Recommended modifications/Survival information

## 2017-02-03 NOTE — DIETITIAN INITIAL EVALUATION ADULT. - PROBLEM SELECTOR PLAN 2
Hyperglycemia typically cause hyponatremia, elevation after IVF possibly due to electrolyte shifts  -Trend BMP  -1/2NS at 100cc/hr

## 2017-02-04 ENCOUNTER — TRANSCRIPTION ENCOUNTER (OUTPATIENT)
Age: 52
End: 2017-02-04

## 2017-02-04 VITALS
RESPIRATION RATE: 18 BRPM | TEMPERATURE: 98 F | DIASTOLIC BLOOD PRESSURE: 81 MMHG | SYSTOLIC BLOOD PRESSURE: 124 MMHG | HEART RATE: 68 BPM | OXYGEN SATURATION: 98 %

## 2017-02-04 LAB
BUN SERPL-MCNC: 10 MG/DL — SIGNIFICANT CHANGE UP (ref 7–23)
CALCIUM SERPL-MCNC: 9.1 MG/DL — SIGNIFICANT CHANGE UP (ref 8.4–10.5)
CHLORIDE SERPL-SCNC: 104 MMOL/L — SIGNIFICANT CHANGE UP (ref 98–107)
CO2 SERPL-SCNC: 24 MMOL/L — SIGNIFICANT CHANGE UP (ref 22–31)
CREAT SERPL-MCNC: 0.93 MG/DL — SIGNIFICANT CHANGE UP (ref 0.5–1.3)
GLUCOSE SERPL-MCNC: 128 MG/DL — HIGH (ref 70–99)
HCT VFR BLD CALC: 40.1 % — SIGNIFICANT CHANGE UP (ref 39–50)
HGB BLD-MCNC: 13 G/DL — SIGNIFICANT CHANGE UP (ref 13–17)
LACTATE SERPL-SCNC: 1.7 MMOL/L — SIGNIFICANT CHANGE UP (ref 0.5–2)
MCHC RBC-ENTMCNC: 29.3 PG — SIGNIFICANT CHANGE UP (ref 27–34)
MCHC RBC-ENTMCNC: 32.4 % — SIGNIFICANT CHANGE UP (ref 32–36)
MCV RBC AUTO: 90.5 FL — SIGNIFICANT CHANGE UP (ref 80–100)
PLATELET # BLD AUTO: 146 K/UL — LOW (ref 150–400)
PMV BLD: 11.9 FL — SIGNIFICANT CHANGE UP (ref 7–13)
POTASSIUM SERPL-MCNC: 4.1 MMOL/L — SIGNIFICANT CHANGE UP (ref 3.5–5.3)
POTASSIUM SERPL-SCNC: 4.1 MMOL/L — SIGNIFICANT CHANGE UP (ref 3.5–5.3)
RBC # BLD: 4.43 M/UL — SIGNIFICANT CHANGE UP (ref 4.2–5.8)
RBC # FLD: 12.6 % — SIGNIFICANT CHANGE UP (ref 10.3–14.5)
SODIUM SERPL-SCNC: 142 MMOL/L — SIGNIFICANT CHANGE UP (ref 135–145)
WBC # BLD: 7.26 K/UL — SIGNIFICANT CHANGE UP (ref 3.8–10.5)
WBC # FLD AUTO: 7.26 K/UL — SIGNIFICANT CHANGE UP (ref 3.8–10.5)

## 2017-02-04 PROCEDURE — 99239 HOSP IP/OBS DSCHRG MGMT >30: CPT

## 2017-02-04 RX ORDER — ENOXAPARIN SODIUM 100 MG/ML
20 INJECTION SUBCUTANEOUS
Qty: 2 | Refills: 0 | OUTPATIENT
Start: 2017-02-04 | End: 2017-03-06

## 2017-02-04 RX ORDER — AMLODIPINE BESYLATE 2.5 MG/1
1 TABLET ORAL
Qty: 30 | Refills: 0 | OUTPATIENT
Start: 2017-02-04 | End: 2017-03-06

## 2017-02-04 RX ORDER — INSULIN LISPRO 100/ML
8 VIAL (ML) SUBCUTANEOUS
Qty: 720 | Refills: 0 | OUTPATIENT
Start: 2017-02-04 | End: 2017-03-06

## 2017-02-04 RX ORDER — DEXTROSE 50 % IN WATER 50 %
1 SYRINGE (ML) INTRAVENOUS
Qty: 1 | Refills: 0 | OUTPATIENT
Start: 2017-02-04 | End: 2017-02-05

## 2017-02-04 RX ORDER — AMLODIPINE BESYLATE 2.5 MG/1
1 TABLET ORAL
Qty: 0 | Refills: 0 | COMMUNITY

## 2017-02-04 RX ADMIN — SODIUM CHLORIDE 100 MILLILITER(S): 9 INJECTION, SOLUTION INTRAVENOUS at 01:22

## 2017-02-04 RX ADMIN — AMLODIPINE BESYLATE 10 MILLIGRAM(S): 2.5 TABLET ORAL at 05:37

## 2017-02-04 RX ADMIN — Medication 8 UNIT(S): at 12:16

## 2017-02-04 RX ADMIN — SODIUM CHLORIDE 100 MILLILITER(S): 9 INJECTION, SOLUTION INTRAVENOUS at 08:52

## 2017-02-04 RX ADMIN — Medication 8 UNIT(S): at 08:46

## 2017-02-04 RX ADMIN — Medication 112 MICROGRAM(S): at 05:37

## 2017-02-04 RX ADMIN — Medication 2: at 12:16

## 2017-02-04 RX ADMIN — HEPARIN SODIUM 5000 UNIT(S): 5000 INJECTION INTRAVENOUS; SUBCUTANEOUS at 05:37

## 2017-02-04 NOTE — DISCHARGE NOTE ADULT - HOSPITAL COURSE
51M hx HTN, HLD, T2DM, hyperthyroidism s/p ablation, p/w lightheadedness in setting of polyuria, polydipsia, nausea, blurry vision x 2 days, found to have glucose >1000 & trace urinary & serum ketones, also w/ HgbA1c 12.4  MICU rejected overnight 2/2/17 - not in DKA, anion gap 14  -S/p insulin gtt stopped 2/2/17  -house Endo on board    Hypernatremia  -Hyperglycemia typically cause hyponatremia, elevation after IVF possibly due to electrolyte shifts  -Trend BMP  -1/2 NS at 100cc/hr   -  encourage PO fluid intake    CAMERON  -Pre-renal given improvement w/ IVF    pt is optimized for discharge 51M hx HTN, HLD, T2DM, hyperthyroidism s/p ablation, p/w lightheadedness in setting of polyuria, polydipsia, nausea, blurry vision x 2 days, found to have glucose >1000 & trace urinary & serum ketones, also w/ HgbA1c 12.4  MICU rejected overnight 2/2/17 - not in DKA, anion gap 14  -S/p insulin gtt stopped 2/2/17  -house Endo on board, started on basal/prandial insulin lantus 20 units hs and humalog 8 units ac; pt was discharged on 2/4/17 and has endocrine appt this coming week (thur)    HTN: controlled on norvasc 10mg in the hospital; at discharge resume diovan/hctz and reduce norvasc to 2.5 mg    Hypernatremia  -Hyperglycemia typically cause hyponatremia, elevation after IVF possibly due to electrolyte shifts  -Trend BMP  -resolved with 1/2 NS    CAMERON  -Pre-renal azotemia, resolved with IVF, diovan/hctz temporarily held    pt is optimized for discharge . Time spent on discharge 35 minutes

## 2017-02-04 NOTE — DISCHARGE NOTE ADULT - ADDITIONAL INSTRUCTIONS
follow up with PCP in 1 week  You are scheduled to see an endocrinologist on 2/9/2017 and 5/15/2017. Please call to confirm the time. [phone]

## 2017-02-04 NOTE — DISCHARGE NOTE ADULT - CARE PROVIDER_API CALL
Beau Shields), EndocrinologyMetabDiabetes; Internal Medicine  [address] [place] [address]  Phone: [phone]  Fax: [phone]

## 2017-02-04 NOTE — DISCHARGE NOTE ADULT - PLAN OF CARE
HgbA1c 12.4 goal below 7% symptoms resolved It is important to keep your sugar under control. Goal AM FS Below 120, bed time below 180 resolved Avoid dehydration, follow up with PCP in 1 week Glucose control Euthyroid continue daily Synthroid BP control Norvasc decreased to 2.5 mg, continue valsartan / hydrochlorothiazide and low salt diet HgbA1c 12.4 goal below 7%. Continue with daily Lantus 20 QHS, Humalog 8 units 3 x day before meals. You are scheduled to see an endocrinologist on 2/9/2017 and 5/15/2017. Please call to confirm the time. [phone]

## 2017-02-04 NOTE — DISCHARGE NOTE ADULT - CARE PLAN
Principal Discharge DX:	Hyperosmolar non-ketotic state in patient with type 2 diabetes mellitus  Secondary Diagnosis:	CAMERON (acute kidney injury)  Secondary Diagnosis:	Diabetes mellitus  Instructions for follow-up, activity and diet:	HgbA1c 12.4 goal below 7%  Secondary Diagnosis:	Thyroid dysfunction  Secondary Diagnosis:	HTN (hypertension) Principal Discharge DX:	Hyperosmolar non-ketotic state in patient with type 2 diabetes mellitus  Goal:	symptoms resolved  Instructions for follow-up, activity and diet:	It is important to keep your sugar under control. Goal AM FS Below 120, bed time below 180  Secondary Diagnosis:	CAMERON (acute kidney injury)  Goal:	resolved  Instructions for follow-up, activity and diet:	Avoid dehydration, follow up with PCP in 1 week  Secondary Diagnosis:	Diabetes mellitus  Goal:	Glucose control  Instructions for follow-up, activity and diet:	HgbA1c 12.4 goal below 7%. Continue with daily Lantus 20 QHS, Humalog 8 units 3 x day before meals. You are scheduled to see an endocrinologist on 2/9/2017 and 5/15/2017. Please call to confirm the time. [phone]  Secondary Diagnosis:	Thyroid dysfunction  Goal:	Euthyroid  Instructions for follow-up, activity and diet:	continue daily Synthroid  Secondary Diagnosis:	HTN (hypertension)  Goal:	BP control  Instructions for follow-up, activity and diet:	Norvasc decreased to 2.5 mg, continue valsartan / hydrochlorothiazide and low salt diet Principal Discharge DX:	Hyperosmolar non-ketotic state in patient with type 2 diabetes mellitus  Goal:	symptoms resolved  Instructions for follow-up, activity and diet:	It is important to keep your sugar under control. Goal AM FS Below 120, bed time below 180  Secondary Diagnosis:	CAMERON (acute kidney injury)  Goal:	resolved  Instructions for follow-up, activity and diet:	Avoid dehydration, follow up with PCP in 1 week  Secondary Diagnosis:	Diabetes mellitus  Goal:	Glucose control  Instructions for follow-up, activity and diet:	HgbA1c 12.4 goal below 7%. Continue with daily Lantus 20 QHS, Humalog 8 units 3 x day before meals. You are scheduled to see an endocrinologist on 2/9/2017 and 5/15/2017. Please call to confirm the time. [phone]  Secondary Diagnosis:	Thyroid dysfunction  Goal:	Euthyroid  Instructions for follow-up, activity and diet:	continue daily Synthroid  Secondary Diagnosis:	HTN (hypertension)  Goal:	BP control  Instructions for follow-up, activity and diet:	Norvasc decreased to 2.5 mg, continue valsartan / hydrochlorothiazide and low salt diet Principal Discharge DX:	Hyperosmolar non-ketotic state in patient with type 2 diabetes mellitus  Goal:	symptoms resolved  Instructions for follow-up, activity and diet:	It is important to keep your sugar under control. Goal AM FS Below 120, bed time below 180  Secondary Diagnosis:	CAMERON (acute kidney injury)  Goal:	resolved  Instructions for follow-up, activity and diet:	Avoid dehydration, follow up with PCP in 1 week  Secondary Diagnosis:	Diabetes mellitus  Goal:	Glucose control  Instructions for follow-up, activity and diet:	HgbA1c 12.4 goal below 7%. Continue with daily Lantus 20 QHS, Humalog 8 units 3 x day before meals. You are scheduled to see an endocrinologist on 2/9/2017 and 5/15/2017. Please call to confirm the time. [phone]  Secondary Diagnosis:	Thyroid dysfunction  Goal:	Euthyroid  Instructions for follow-up, activity and diet:	continue daily Synthroid  Secondary Diagnosis:	HTN (hypertension)  Goal:	BP control  Instructions for follow-up, activity and diet:	Norvasc decreased to 2.5 mg, continue valsartan / hydrochlorothiazide and low salt diet Principal Discharge DX:	Hyperosmolar non-ketotic state in patient with type 2 diabetes mellitus  Goal:	symptoms resolved  Instructions for follow-up, activity and diet:	It is important to keep your sugar under control. Goal AM FS Below 120, bed time below 180  Secondary Diagnosis:	CAMERON (acute kidney injury)  Goal:	resolved  Instructions for follow-up, activity and diet:	Avoid dehydration, follow up with PCP in 1 week  Secondary Diagnosis:	Diabetes mellitus  Goal:	Glucose control  Instructions for follow-up, activity and diet:	HgbA1c 12.4 goal below 7%. Continue with daily Lantus 20 QHS, Humalog 8 units 3 x day before meals. You are scheduled to see an endocrinologist on 2/9/2017 and 5/15/2017. Please call to confirm the time. [phone]  Secondary Diagnosis:	Thyroid dysfunction  Goal:	Euthyroid  Instructions for follow-up, activity and diet:	continue daily Synthroid  Secondary Diagnosis:	HTN (hypertension)  Goal:	BP control  Instructions for follow-up, activity and diet:	Norvasc decreased to 2.5 mg, continue valsartan / hydrochlorothiazide and low salt diet

## 2017-02-04 NOTE — DISCHARGE NOTE ADULT - MEDICATION SUMMARY - MEDICATIONS TO TAKE
I will START or STAY ON the medications listed below when I get home from the hospital:    pen needled 31G 5 mm, 4 x daily injections, Dx E11.65  -- Indication: For Diabetes mellitus    one touch verio test strips, FS 4 x day, E11.65  -- Indication: For Diabetes mellitus    once touch verio lancets FS 4 x day  -- Indication: For Diabetes mellitus    Lantus Solostar Pen 100 units/mL subcutaneous solution  -- 20 subcutaneous once a day (at bedtime)  -- Do not drink alcoholic beverages when taking this medication.  It is very important that you take or use this exactly as directed.  Do not skip doses or discontinue unless directed by your doctor.  Keep in refrigerator.  Do not freeze.    -- Indication: For Diabetes mellitus    HumaLOG KwikPen 100 units/mL subcutaneous solution  -- 8 unit(s) subcutaneous 3 times a day, before meals   -- Do not drink alcoholic beverages when taking this medication.  It is very important that you take or use this exactly as directed.  Do not skip doses or discontinue unless directed by your doctor.  Keep in refrigerator.  Do not freeze.    -- Indication: For Diabetes mellitus    metFORMIN 500 mg oral tablet  -- 1 tab(s) by mouth 2 times a day  -- Indication: For T2DM (type 2 diabetes mellitus)    simvastatin 40 mg oral tablet  -- 1 tab(s) by mouth once a day (at bedtime)  -- Indication: For HLD (hyperlipidemia)    valsartan-hydroCHLOROthiazide 160mg-12.5mg oral tablet  -- 1 tab(s) by mouth once a day  -- Indication: For Hypertention     amLODIPine 2.5 mg oral tablet  -- 1 tab(s) by mouth once a day  -- It is very important that you take or use this exactly as directed.  Do not skip doses or discontinue unless directed by your doctor.  Some non-prescription drugs may aggravate your condition.  Read all labels carefully.  If a warning appears, check with your doctor before taking.    -- Indication: For Hypertention     glucose 40% oral gel  -- 1 dose(s) by mouth once, As needed, Blood Glucose LESS THAN 70 milliGRAM(s)/deciliter  -- Indication: For Hypoglycemia     Synthroid 112 mcg (0.112 mg) oral tablet  -- 1 tab(s) by mouth once a day  -- Indication: For Thyroid dysfunction

## 2017-02-04 NOTE — DISCHARGE NOTE ADULT - MEDICATION SUMMARY - MEDICATIONS TO CHANGE
I will SWITCH the dose or number of times a day I take the medications listed below when I get home from the hospital:    amLODIPine 10 mg oral tablet  -- 1 tab(s) by mouth once a day

## 2017-02-04 NOTE — DISCHARGE NOTE ADULT - PATIENT PORTAL LINK FT
“You can access the [facility] Patient Portal, offered by [facility], by registering with the following website: [email or web address]”

## 2017-02-04 NOTE — DISCHARGE NOTE ADULT - CARE PROVIDERS DIRECT ADDRESSES
[email or web address],DirectAddress_Unknown

## 2017-02-09 ENCOUNTER — APPOINTMENT (OUTPATIENT)
Dept: ENDOCRINOLOGY | Facility: CLINIC | Age: 52
End: 2017-02-09

## 2017-02-17 ENCOUNTER — APPOINTMENT (OUTPATIENT)
Dept: ENDOCRINOLOGY | Facility: CLINIC | Age: 52
End: 2017-02-17

## 2019-01-25 PROBLEM — E05.90 THYROTOXICOSIS, UNSPECIFIED WITHOUT THYROTOXIC CRISIS OR STORM: Chronic | Status: ACTIVE | Noted: 2017-02-02

## 2019-02-05 ENCOUNTER — APPOINTMENT (OUTPATIENT)
Dept: ORTHOPEDIC SURGERY | Facility: CLINIC | Age: 54
End: 2019-02-05

## 2019-02-07 ENCOUNTER — APPOINTMENT (OUTPATIENT)
Dept: ORTHOPEDIC SURGERY | Facility: CLINIC | Age: 54
End: 2019-02-07

## 2019-02-14 ENCOUNTER — APPOINTMENT (OUTPATIENT)
Dept: ORTHOPEDIC SURGERY | Facility: CLINIC | Age: 54
End: 2019-02-14
Payer: COMMERCIAL

## 2019-02-14 VITALS
HEIGHT: 71 IN | HEART RATE: 64 BPM | DIASTOLIC BLOOD PRESSURE: 83 MMHG | BODY MASS INDEX: 29.4 KG/M2 | WEIGHT: 210 LBS | SYSTOLIC BLOOD PRESSURE: 125 MMHG

## 2019-02-14 DIAGNOSIS — Z86.39 PERSONAL HISTORY OF OTHER ENDOCRINE, NUTRITIONAL AND METABOLIC DISEASE: ICD-10-CM

## 2019-02-14 DIAGNOSIS — Z78.9 OTHER SPECIFIED HEALTH STATUS: ICD-10-CM

## 2019-02-14 DIAGNOSIS — M77.8 OTHER ENTHESOPATHIES, NOT ELSEWHERE CLASSIFIED: ICD-10-CM

## 2019-02-14 PROCEDURE — 20550 NJX 1 TENDON SHEATH/LIGAMENT: CPT | Mod: RT

## 2019-02-14 PROCEDURE — 99203 OFFICE O/P NEW LOW 30 MIN: CPT | Mod: 25

## 2020-08-10 NOTE — ED PROVIDER NOTE - SECONDARY DIAGNOSIS.
Cheek-To-Nose Interpolation Flap Text: A decision was made to reconstruct the defect utilizing an interpolation axial flap and a staged reconstruction.  A telfa template was made of the defect.  This telfa template was then used to outline the Cheek-To-Nose Interpolation flap.  The donor area for the pedicle flap was then injected with anesthesia.  The flap was excised through the skin and subcutaneous tissue down to the layer of the underlying musculature.  The interpolation flap was carefully excised within this deep plane to maintain its blood supply.  The edges of the donor site were undermined.   The donor site was closed in a primary fashion.  The pedicle was then rotated into position and sutured.  Once the tube was sutured into place, adequate blood supply was confirmed with blanching and refill.  The pedicle was then wrapped with xeroform gauze and dressed appropriately with a telfa and gauze bandage to ensure continued blood supply and protect the attached pedicle. Diabetes mellitus

## 2022-03-30 NOTE — H&P ADULT. - VENOUS THROMBOEMBOLISM
"Chief Complaint   Patient presents with     Pre-Op Exam       Initial /70   Pulse 87   Temp 97.7  F (36.5  C) (Tympanic)   Resp 18   Wt 76.2 kg (168 lb)   SpO2 98%   BMI 27.96 kg/m   Estimated body mass index is 27.96 kg/m  as calculated from the following:    Height as of 3/14/22: 1.651 m (5' 5\").    Weight as of this encounter: 76.2 kg (168 lb).  Medication Reconciliation: complete  Nafisa Cline LPN  "
no

## 2023-01-01 NOTE — DISCHARGE NOTE ADULT - VISION (WITH CORRECTIVE LENSES IF THE PATIENT USUALLY WEARS THEM):
blurry vision r/t this admission dx/Normal vision: sees adequately in most situations; can see medication labels, newsprint
---